# Patient Record
Sex: FEMALE | Race: BLACK OR AFRICAN AMERICAN | NOT HISPANIC OR LATINO | URBAN - METROPOLITAN AREA
[De-identification: names, ages, dates, MRNs, and addresses within clinical notes are randomized per-mention and may not be internally consistent; named-entity substitution may affect disease eponyms.]

---

## 2024-03-14 ENCOUNTER — OV NP (OUTPATIENT)
Dept: URBAN - METROPOLITAN AREA CLINIC 118 | Facility: CLINIC | Age: 24
End: 2024-03-14
Payer: COMMERCIAL

## 2024-03-14 ENCOUNTER — LAB (OUTPATIENT)
Dept: URBAN - METROPOLITAN AREA CLINIC 118 | Facility: CLINIC | Age: 24
End: 2024-03-14

## 2024-03-14 VITALS
TEMPERATURE: 97.9 F | HEIGHT: 64 IN | BODY MASS INDEX: 29.71 KG/M2 | WEIGHT: 174 LBS | SYSTOLIC BLOOD PRESSURE: 113 MMHG | HEART RATE: 77 BPM | DIASTOLIC BLOOD PRESSURE: 76 MMHG

## 2024-03-14 DIAGNOSIS — R10.13 DYSPEPSIA: ICD-10-CM

## 2024-03-14 DIAGNOSIS — R19.5 LOOSE STOOLS: ICD-10-CM

## 2024-03-14 DIAGNOSIS — K92.1 HEMATOCHEZIA: ICD-10-CM

## 2024-03-14 DIAGNOSIS — R10.30 LOWER ABDOMINAL PAIN: ICD-10-CM

## 2024-03-14 PROCEDURE — 99204 OFFICE O/P NEW MOD 45 MIN: CPT | Performed by: INTERNAL MEDICINE

## 2024-03-14 RX ORDER — DICYCLOMINE HYDROCHLORIDE 10 MG/1
1 CAPSULES CAPSULE ORAL
Qty: 90 | Refills: 3 | OUTPATIENT
Start: 2024-03-14 | End: 2024-07-12

## 2024-03-14 NOTE — PHYSICAL EXAM GASTROINTESTINAL
Abdomen: soft and nondistended, no visible masses, tender to palpation of lower abdomen, no guarding or rigidity, no rebound tenderness, no palpable masses, normal bowel sounds. Liver and Spleen: no hepatosplenomegaly, liver nontender. Rectal: Deferred

## 2024-03-14 NOTE — HPI-TODAY'S VISIT:
Pt presents for evaluation of abdominal pain. She was seen at [facility] in Oct 2023, where reported she had been previously treated about a year ago for "salmonella poisoning" and since then has had intermittent persistent abdominal pain, gradually worsening. No recent labs in the last year upon chart review through [facility] Everywhere. Constant dull ache and intermittent sharp pain in lower abdomen. Believes pain radiates to rectal area. Improves temporarily after having BM. Tried pepto and paradrops because believed she had parasites, possibly some improvement with paradrops. Has 7-9 BMs/day loose stool, otherwise sometimes formed. No watery diarrhea or hard stools. Prior to "salmonella episode" had formed BM q few days. Associated postprandial pain, bowel urgency, bloating, and dark stool x 1 last month. BRBPR and mucus in stool intermittently for 9 months, episodes ~1x/wk. Denies hx hemorrhoids. N/V with one bout of emesis in October. No hematemesis. No fever/chills or unintentional weight loss. Denies frequent or heavy NSAID, tobacco, or ETOH use. No FHX IBD, celiac, or GI cancer. No prior abd surgeries or colonoscopy/EGD.

## 2024-03-15 LAB
A/G RATIO: 1.6
ABSOLUTE BASOPHILS: 33
ABSOLUTE EOSINOPHILS: 112
ABSOLUTE LYMPHOCYTES: 1690
ABSOLUTE MONOCYTES: 363
ABSOLUTE NEUTROPHILS: 4402
ALBUMIN: 4.5
ALKALINE PHOSPHATASE: 46
ALT (SGPT): 13
AST (SGOT): 16
BASOPHILS: 0.5
BILIRUBIN, TOTAL: 0.6
BUN/CREATININE RATIO: (no result)
BUN: 15
C-REACTIVE PROTEIN, QUANT: 0.9
CALCIUM: 9.4
CARBON DIOXIDE, TOTAL: 25
CHLORIDE: 104
CREATININE: 0.73
EGFR: 118
EOSINOPHILS: 1.7
GLOBULIN, TOTAL: 2.8
GLUCOSE: 84
HEMATOCRIT: 41.4
HEMOGLOBIN: 14
IMMUNOGLOBULIN A: 306
INTERPRETATION: (no result)
LYMPHOCYTES: 25.6
MCH: 30.6
MCHC: 33.8
MCV: 90.6
MONOCYTES: 5.5
MPV: 10.4
NEUTROPHILS: 66.7
PLATELET COUNT: 303
POTASSIUM: 4.3
PROTEIN, TOTAL: 7.3
RDW: 12.1
RED BLOOD CELL COUNT: 4.57
SODIUM: 139
TISSUE TRANSGLUTAMINASE AB, IGA: <1
WHITE BLOOD CELL COUNT: 6.6

## 2024-04-09 ENCOUNTER — COLON (OUTPATIENT)
Dept: URBAN - METROPOLITAN AREA SURGERY CENTER 23 | Facility: SURGERY CENTER | Age: 24
End: 2024-04-09
Payer: COMMERCIAL

## 2024-04-09 DIAGNOSIS — K92.1 HEMATOCHEZIA: ICD-10-CM

## 2024-04-09 DIAGNOSIS — R19.4 ALTERED BOWEL FUNCTION: ICD-10-CM

## 2024-04-09 PROCEDURE — 45380 COLONOSCOPY AND BIOPSY: CPT | Performed by: INTERNAL MEDICINE

## 2024-04-09 RX ORDER — DICYCLOMINE HYDROCHLORIDE 10 MG/1
1 CAPSULES CAPSULE ORAL
Qty: 90 | Refills: 3 | Status: ACTIVE | COMMUNITY
Start: 2024-03-14 | End: 2024-07-12

## 2024-05-16 ENCOUNTER — OFFICE VISIT (OUTPATIENT)
Dept: URBAN - METROPOLITAN AREA CLINIC 118 | Facility: CLINIC | Age: 24
End: 2024-05-16

## 2024-05-16 RX ORDER — DICYCLOMINE HYDROCHLORIDE 10 MG/1
1 CAPSULES CAPSULE ORAL
Qty: 90 | Refills: 3 | Status: ACTIVE | COMMUNITY
Start: 2024-03-14 | End: 2024-07-12